# Patient Record
Sex: MALE | Race: BLACK OR AFRICAN AMERICAN | NOT HISPANIC OR LATINO | URBAN - NONMETROPOLITAN AREA
[De-identification: names, ages, dates, MRNs, and addresses within clinical notes are randomized per-mention and may not be internally consistent; named-entity substitution may affect disease eponyms.]

---

## 2017-12-18 PROBLEM — H25.13: Noted: 2017-12-18

## 2017-12-18 PROBLEM — H52.4: Noted: 2017-12-18

## 2017-12-18 PROBLEM — H52.03: Noted: 2017-12-18

## 2017-12-18 PROBLEM — H40.1131: Noted: 2017-12-18

## 2019-06-14 ENCOUNTER — IMPORTED ENCOUNTER (OUTPATIENT)
Dept: URBAN - NONMETROPOLITAN AREA CLINIC 1 | Facility: CLINIC | Age: 69
End: 2019-06-14

## 2019-06-14 PROCEDURE — 92015 DETERMINE REFRACTIVE STATE: CPT

## 2019-06-14 PROCEDURE — 92250 FUNDUS PHOTOGRAPHY W/I&R: CPT

## 2019-06-14 PROCEDURE — 92014 COMPRE OPH EXAM EST PT 1/>: CPT

## 2019-06-14 NOTE — PATIENT DISCUSSION
POAG OU:- Discussed diagnosis in detail with patient-  Optos done today stable findings on exam today- OCT done previous stable findings. - Gonio done today grade IV with moderate pigment OU- VF done previously:  OD & OS: reliable w/scattered non-specific defects (non-glaucomatous). [facility] done shows  .- Hx of SLT in September of 2013- IOP OD 9 and OS 8 - Cup / Disc .5 OD and .75 OS - Continue Xalatan OU QHS- Continue to monitor [place] OU- Discussed diagnosis in detail with patient- Discussed signs and symptoms of progression- Discussed UV protection- No treatment needed at this time - Continue to monitorHyperopia/Presbyopia OU- Discussed diagnosis in detail with patient- New GLRx given today- Continue to monitor; 's Notes: Nadia Chopra had set target IOPs <13 OD <16 OS____________MR  6/14/19DFE  6/14/19OCT 12/18/17Optos 6/14/19VF  6/29/2017Gonio  6/14/19PACH  12/6/16 - Olga Dye

## 2019-10-24 NOTE — PATIENT DISCUSSION
GETS WHITE SPOT OD WHEN CLOSE TO AND TALKING TO PT - LASTS WHEN SHE IS TALKING GOES AWAY WHEN SHE STOPS.

## 2019-12-27 ENCOUNTER — IMPORTED ENCOUNTER (OUTPATIENT)
Dept: URBAN - NONMETROPOLITAN AREA CLINIC 1 | Facility: CLINIC | Age: 69
End: 2019-12-27

## 2019-12-27 PROCEDURE — 92133 CPTRZD OPH DX IMG PST SGM ON: CPT

## 2019-12-27 PROCEDURE — 92014 COMPRE OPH EXAM EST PT 1/>: CPT

## 2019-12-27 PROCEDURE — 92083 EXTENDED VISUAL FIELD XM: CPT

## 2019-12-27 NOTE — PATIENT DISCUSSION
POAG OU:- Discussed diagnosis in detail with patient- Optos done previously stable findings on exam today- OCT done today OD shows Borderline Superior and Inferior NFL thinning and OS shows No NFL thinning  - Gonio done previously grade IV with moderate pigment OU- VF done today shows Good fields and Normal OU- PACH done shows  .- Hx of SLT in September of 2013- IOP 12 OU - Cup / Disc .7 OD and .6 OS - Continue Xalatan OU QHS- Continue to monitor - RTC 6 months F/U with Optos and [facility] OU- Discussed diagnosis in detail with patient- Discussed signs and symptoms of progression- Discussed UV protection- No treatment needed at this time - Continue to monitorHyperopia/Presbyopia OU- Discussed diagnosis in detail with patient- Continue to monitor; 's Notes: Carline Wilson had set target IOPs <13 OD <16 OS____________MR  6/14/19DFE  6/14/19OCT 12/27/19Optos 6/14/19VF  12/27/19Gonio  6/14/19PACH  12/6/16 - Mary Watson

## 2020-06-26 ENCOUNTER — IMPORTED ENCOUNTER (OUTPATIENT)
Dept: URBAN - NONMETROPOLITAN AREA CLINIC 1 | Facility: CLINIC | Age: 70
End: 2020-06-26

## 2020-06-26 PROBLEM — H52.4: Noted: 2020-06-26

## 2020-06-26 PROBLEM — H52.03: Noted: 2020-06-26

## 2020-06-26 PROBLEM — H25.13: Noted: 2020-06-26

## 2020-06-26 PROBLEM — H40.1131: Noted: 2020-06-26

## 2020-06-26 PROCEDURE — 92014 COMPRE OPH EXAM EST PT 1/>: CPT

## 2020-06-26 PROCEDURE — 92015 DETERMINE REFRACTIVE STATE: CPT

## 2020-06-26 NOTE — PATIENT DISCUSSION
POAG OU:- Discussed diagnosis in detail with patient- Optos done today stable findings on exam today- OCT done previously OD shows Borderline Superior and Inferior NFL thinning and OS shows No NFL thinning  - Gonio done previously grade IV with moderate pigment OU- VF done previously shows Good fields and Normal OU- PACH done shows  .- Hx of SLT in September of 2013- IOP 13 OD and 12 OS stable.  - Cup / Disc 0.7 OD and 0.6 OS - Continue Xalatan OU QHS- Continue to monitor - RTC 6 months with VF and OCTNSC OU- Discussed diagnosis in detail with patient- Discussed signs and symptoms of progression- Discussed UV protection- No treatment needed at this time - Continue to monitorHyperopia/Presbyopia OU- Discussed diagnosis in detail with patient- MR done new GLRx given- Continue to monitor; 's Notes: Steff Holman had set target IOPs <13 OD <16 OS____________MR  6/26/20DFE  6/26/20OCT 12/27/19Optos 6/26/20VF  12/27/19Gonio  6/14/19PACH  12/6/16 - Geno Stevenson

## 2021-02-08 ENCOUNTER — IMPORTED ENCOUNTER (OUTPATIENT)
Dept: URBAN - NONMETROPOLITAN AREA CLINIC 1 | Facility: CLINIC | Age: 71
End: 2021-02-08

## 2021-02-08 PROCEDURE — 92133 CPTRZD OPH DX IMG PST SGM ON: CPT

## 2021-02-08 PROCEDURE — 92083 EXTENDED VISUAL FIELD XM: CPT

## 2021-02-08 PROCEDURE — 99214 OFFICE O/P EST MOD 30 MIN: CPT

## 2021-02-08 NOTE — PATIENT DISCUSSION
POAG OU:- Discussed diagnosis in detail with patient- Optos done today stable findings on exam today- OCT done today OD shows inferior NFL thinning and OS shows No NFL thinning  - Gonio done previously grade IV with moderate pigment OU- VF done today shows Good fields and Normal OU- PACH done shows  .- Hx of SLT in September of 2013- IOP at 10 OU- Cup / Disc 0.7 OD and 0.6 OS - Continue Xalatan OU QHS- Continue to monitor [place] OU- Discussed diagnosis in detail with patient- Discussed signs and symptoms of progression- Discussed UV protection- No treatment needed at this time - Continue to monitorHyperopia/Presbyopia OU- Discussed diagnosis in detail with patient- Continue to monitor; Dr's Notes: Dennise Terry had set target IOPs <13 OD <16 OS____________MR  6/26/20DFE  6/26/20OCT  2/8/21Optos 6/26/20VF  2/8/21Gonio  6/14/19PACH  12/6/16 - Ayanna Haegn

## 2021-06-28 ENCOUNTER — IMPORTED ENCOUNTER (OUTPATIENT)
Dept: URBAN - NONMETROPOLITAN AREA CLINIC 1 | Facility: CLINIC | Age: 71
End: 2021-06-28

## 2021-06-28 PROCEDURE — 92015 DETERMINE REFRACTIVE STATE: CPT

## 2021-06-28 PROCEDURE — 92014 COMPRE OPH EXAM EST PT 1/>: CPT

## 2021-06-28 NOTE — PATIENT DISCUSSION
POAG OU:- Discussed diagnosis in detail with patient- Optos done previously stable findings on exam today- OCT done previously OD shows inferior NFL thinning and OS shows No NFL thinning  - Gonio done previously grade IV with moderate pigment OU- VF done previously shows Good fields and Normal OU- PACH done previously shows  .- Hx of SLT in September of 2013- IOP at 16 OU- Cup / Disc 0.7 OD and 0.6 OS - Continue Xalatan OU QHS- Continue to monitor [place] OU- Discussed diagnosis in detail with patient- Discussed signs and symptoms of progression- Discussed UV protection- Discussed different treatment options with patient patient defers treatment at this time  - Progression noted today - Continue to monitorHyperopia/Presbyopia OU- Discussed diagnosis in detail with patient- New glasses RX given today- Continue to monitor; 's Notes: Rl Solano had set target IOPs <13 OD <16 OS____________MR  6/26/20DFE  6/26/20OCT  2/8/21Optos 6/26/20VF  2/8/21Gonio  6/14/19PACH  12/6/16 - Zaina Bejarano

## 2022-04-15 ASSESSMENT — TONOMETRY
OS_IOP_MMHG: 12
OS_IOP_MMHG: 16
OD_IOP_MMHG: 16
OD_IOP_MMHG: 09
OD_IOP_MMHG: 10
OS_IOP_MMHG: 08
OD_IOP_MMHG: 13
OS_IOP_MMHG: 10
OD_IOP_MMHG: 12
OS_IOP_MMHG: 12

## 2022-04-15 ASSESSMENT — VISUAL ACUITY
OS_SC: 20/25-2
OS_SC: 20/30-
OS_SC: 20/20-
OD_SC: 20/25-2
OD_SC: 20/20
OD_SC: 20/25
OS_SC: 20/25-2
OS_SC: 20/30-2
OD_SC: 20/25-2
OU_CC: 20/25
OD_SC: 20/22-2

## 2022-04-15 ASSESSMENT — PACHYMETRY
OD_CT_UM: 563; ADJ: WNL
OS_CT_UM: 561; ADJ: WNL
OS_CT_UM: 561; ADJ: WNL
OD_CT_UM: 563; ADJ: WNL
OS_CT_UM: 561; ADJ: WNL
OD_CT_UM: 563; ADJ: WNL
OD_CT_UM: 563; ADJ: WNL
OS_CT_UM: 561; ADJ: WNL
OD_CT_UM: 563; ADJ: WNL
OS_CT_UM: 561; ADJ: WNL

## 2023-08-07 ENCOUNTER — ESTABLISHED PATIENT (OUTPATIENT)
Dept: URBAN - NONMETROPOLITAN AREA CLINIC 1 | Facility: CLINIC | Age: 73
End: 2023-08-07

## 2023-08-07 DIAGNOSIS — H52.4: ICD-10-CM

## 2023-08-07 PROCEDURE — 92015 DETERMINE REFRACTIVE STATE: CPT

## 2023-08-07 PROCEDURE — 92014 COMPRE OPH EXAM EST PT 1/>: CPT

## 2023-08-07 ASSESSMENT — VISUAL ACUITY
OS_PH: 20/30
OD_PH: 20/30
OS_BAT: 20/40-1
OD_BAT: 20/40-1
OS_CC: 20/40+1
OD_CC: 20/40+1

## 2023-08-07 ASSESSMENT — TONOMETRY
OD_IOP_MMHG: 15
OS_IOP_MMHG: 14

## 2024-09-27 ENCOUNTER — FOLLOW UP (OUTPATIENT)
Dept: URBAN - NONMETROPOLITAN AREA CLINIC 1 | Facility: CLINIC | Age: 74
End: 2024-09-27

## 2024-09-27 DIAGNOSIS — H25.13: ICD-10-CM

## 2024-09-27 DIAGNOSIS — H40.1131: ICD-10-CM

## 2024-09-27 PROCEDURE — 99213 OFFICE O/P EST LOW 20 MIN: CPT

## 2024-09-27 PROCEDURE — 92133 CPTRZD OPH DX IMG PST SGM ON: CPT

## 2024-09-27 PROCEDURE — 92083 EXTENDED VISUAL FIELD XM: CPT
